# Patient Record
Sex: FEMALE | Race: OTHER | Employment: FULL TIME | URBAN - METROPOLITAN AREA
[De-identification: names, ages, dates, MRNs, and addresses within clinical notes are randomized per-mention and may not be internally consistent; named-entity substitution may affect disease eponyms.]

---

## 2022-06-22 PROBLEM — N94.6 DYSMENORRHEA: Status: ACTIVE | Noted: 2022-06-22

## 2022-06-22 PROBLEM — E66.9 CLASS 2 OBESITY WITH BODY MASS INDEX (BMI) OF 36.0 TO 36.9 IN ADULT: Status: ACTIVE | Noted: 2022-06-22

## 2022-06-22 PROBLEM — E66.01 CLASS 2 SEVERE OBESITY DUE TO EXCESS CALORIES WITH SERIOUS COMORBIDITY AND BODY MASS INDEX (BMI) OF 36.0 TO 36.9 IN ADULT (HCC): Status: ACTIVE | Noted: 2022-06-22

## 2022-06-22 PROBLEM — K76.0 NAFL (NONALCOHOLIC FATTY LIVER): Status: ACTIVE | Noted: 2022-06-22

## 2022-10-27 ENCOUNTER — OFFICE VISIT (OUTPATIENT)
Dept: OBGYN CLINIC | Age: 42
End: 2022-10-27
Payer: COMMERCIAL

## 2022-10-27 VITALS
RESPIRATION RATE: 20 BRPM | HEIGHT: 64 IN | HEART RATE: 60 BPM | WEIGHT: 213.4 LBS | DIASTOLIC BLOOD PRESSURE: 87 MMHG | SYSTOLIC BLOOD PRESSURE: 156 MMHG | BODY MASS INDEX: 36.43 KG/M2 | OXYGEN SATURATION: 99 %

## 2022-10-27 DIAGNOSIS — N94.6 DYSMENORRHEA: Primary | ICD-10-CM

## 2022-10-27 DIAGNOSIS — N92.6 IRREGULAR PERIODS/MENSTRUAL CYCLES: ICD-10-CM

## 2022-10-27 PROBLEM — D21.9 FIBROIDS: Status: ACTIVE | Noted: 2022-10-27

## 2022-10-27 PROCEDURE — 99204 OFFICE O/P NEW MOD 45 MIN: CPT | Performed by: OBSTETRICS & GYNECOLOGY

## 2022-10-27 RX ORDER — MEDROXYPROGESTERONE ACETATE 150 MG/ML
150 INJECTION, SUSPENSION INTRAMUSCULAR ONCE
Qty: 1 EACH | Refills: 1 | Status: SHIPPED | OUTPATIENT
Start: 2022-10-27 | End: 2022-10-27

## 2022-10-27 NOTE — PROGRESS NOTES
Quinton Bates is a , 35 y.o. female   Patient's last menstrual period was 10/06/2022 (exact date). She presents for her problem    She is having  dysmenorrhea and irregular cycles- had an ablation in - initially did okay but now back to previous menses with pain prior to cycle and for the first several days . Tried OCP many years ago  Denies any  or GI sxs      Menstrual status:  Cycles are often irregular with no apparent pattern. Flow: moderate. She has dysmenorrhea. Medical conditions:  Since her last annual GYN exam about two years ago, she has not the following changes in her health history: none. Mammogram History:    YULY Results (most recent):  No results found for this or any previous visit. DEXA Results (most recent):  No results found for this or any previous visit. Past Medical History:   Diagnosis Date    Anemia     in childhood    History of mammography, screening      NORMAL     Past Surgical History:   Procedure Laterality Date    HX ENDOSCOPY      HX GYN  2021    ablation     HX SEPTOPLASTY      HX TUBAL LIGATION         Prior to Admission medications    Medication Sig Start Date End Date Taking? Authorizing Provider   medroxyPROGESTERone (DEPO-PROVERA) 150 mg/mL syrg 1 mL by IntraMUSCular route once for 1 dose. 10/27/22 10/27/22 Yes Max Feng MD   ibuprofen 200 mg cap Take  by mouth. Provider, Historical   cetirizine (ZyrTEC) 10 mg tablet Take 10 mg by mouth daily as needed for Allergies. Provider, Historical   Esomeprazole Magnesium (NexIUM Packet) Take 20 mg by mouth daily as needed for Gastroesophageal Reflux Disease (GERD). Provider, Historical   FLUoxetine (PROzac) 10 mg tablet Take 10 mg by mouth daily as needed. Provider, Historical       No Known Allergies       Tobacco History:  reports that she has never smoked.  She has never used smokeless tobacco.  Alcohol use:  reports no history of alcohol use.  Drug use:  reports no history of drug use. Family Medical/Cancer History:   Family History   Problem Relation Age of Onset    Hypertension Mother     Anemia Neg Hx           Review of Systems   Constitutional:  Negative for chills, fever, malaise/fatigue and weight loss. HENT:  Negative for congestion, ear pain, sinus pain and tinnitus. Eyes:  Negative for blurred vision and double vision. Respiratory:  Negative for cough, shortness of breath and wheezing. Cardiovascular:  Negative for chest pain and palpitations. Gastrointestinal:  Negative for abdominal pain, blood in stool, constipation, diarrhea, heartburn, nausea and vomiting. Genitourinary:  Negative for dysuria, flank pain, frequency, hematuria and urgency. Musculoskeletal:  Negative for joint pain and myalgias. Skin:  Negative for itching and rash. Neurological:  Negative for dizziness, weakness and headaches. Psychiatric/Behavioral:  Negative for depression, memory loss and suicidal ideas. The patient is not nervous/anxious and does not have insomnia. Physical Exam  Constitutional:       Appearance: Normal appearance. HENT:      Head: Normocephalic and atraumatic. Pulmonary:      Effort: Pulmonary effort is normal.   Abdominal:      General: Abdomen is flat. Palpations: Abdomen is soft. Genitourinary:     General: Normal vulva. Vagina: Normal.      Cervix: Normal.      Uterus: Normal.       Adnexa: Right adnexa normal and left adnexa normal.      Rectum: Normal.      Comments: No PAP obtained  Neurological:      Mental Status: She is alert. Psychiatric:         Mood and Affect: Mood normal.         Behavior: Behavior normal.         Thought Content:  Thought content normal.        Visit Vitals  BP (!) 156/87 (BP 1 Location: Right upper arm, BP Patient Position: Sitting, BP Cuff Size: Adult)   Pulse 60   Resp 20   Ht 5' 4\" (1.626 m)   Wt 213 lb 6.4 oz (96.8 kg)   LMP 10/06/2022 (Exact Date)   SpO2 99% BMI 36.63 kg/m²         Assessment: Diagnoses and all orders for this visit:    1. Dysmenorrhea    2. Irregular periods/menstrual cycles    Other orders  -     medroxyPROGESTERone (DEPO-PROVERA) 150 mg/mL syrg; 1 mL by IntraMUSCular route once for 1 dose. Diff. Dx and anatomy DWP in detail- endometriosis, adenomyosis, fibroids  Options DWP- BC, LAVH, nothing, repeat ablation( not recommended)    Plan: Questions addressed, Desires a trial of Depo, f/up in 3 mths  Counseled re: diet, exercise, healthy lifestyle  Return for Annual  Rec annual mammogram        Follow-up and Dispositions    Return in about 3 months (around 1/27/2023) for Follow-up.

## 2022-10-27 NOTE — PROGRESS NOTES
1. Have you been to the ER, urgent care clinic since your last visit? Hospitalized since your last visit? No    2. Have you seen or consulted any other health care providers outside of the [address] [place] [address] since your last visit? Include any pap smears or colon screening.  No    Visit Vitals  BP (!) 156/87 (BP 1 Location: Right upper arm, BP Patient Position: Sitting, BP Cuff Size: Adult)   Pulse 60   Resp 20   Ht 5' 4\" (1.626 m)   Wt 213 lb 6.4 oz (96.8 kg)   LMP 10/06/2022 (Exact Date)   SpO2 99%   BMI 36.63 kg/m²       Chief Complaint   Patient presents with    New Patient    Irregular Menses

## 2022-10-31 ENCOUNTER — OFFICE VISIT (OUTPATIENT)
Dept: FAMILY MEDICINE CLINIC | Age: 42
End: 2022-10-31
Payer: COMMERCIAL

## 2022-10-31 VITALS
BODY MASS INDEX: 36.67 KG/M2 | HEART RATE: 70 BPM | HEIGHT: 64 IN | DIASTOLIC BLOOD PRESSURE: 80 MMHG | TEMPERATURE: 97.5 F | SYSTOLIC BLOOD PRESSURE: 124 MMHG | WEIGHT: 214.8 LBS | OXYGEN SATURATION: 99 %

## 2022-10-31 DIAGNOSIS — Z11.59 SCREENING FOR VIRAL DISEASE: ICD-10-CM

## 2022-10-31 DIAGNOSIS — Z23 ENCOUNTER FOR IMMUNIZATION: ICD-10-CM

## 2022-10-31 DIAGNOSIS — Z13.31 DEPRESSION SCREENING: ICD-10-CM

## 2022-10-31 DIAGNOSIS — Z00.00 WELLNESS EXAMINATION: Primary | ICD-10-CM

## 2022-10-31 DIAGNOSIS — Z13.220 SCREENING FOR LIPOID DISORDERS: ICD-10-CM

## 2022-10-31 DIAGNOSIS — E66.01 CLASS 2 SEVERE OBESITY DUE TO EXCESS CALORIES WITH SERIOUS COMORBIDITY AND BODY MASS INDEX (BMI) OF 36.0 TO 36.9 IN ADULT (HCC): ICD-10-CM

## 2022-10-31 PROCEDURE — 90715 TDAP VACCINE 7 YRS/> IM: CPT | Performed by: FAMILY MEDICINE

## 2022-10-31 PROCEDURE — 90471 IMMUNIZATION ADMIN: CPT | Performed by: FAMILY MEDICINE

## 2022-10-31 PROCEDURE — 99396 PREV VISIT EST AGE 40-64: CPT | Performed by: FAMILY MEDICINE

## 2022-10-31 RX ORDER — MEDROXYPROGESTERONE ACETATE 150 MG/ML
INJECTION, SUSPENSION INTRAMUSCULAR
COMMUNITY
Start: 2022-10-27

## 2022-10-31 NOTE — PROGRESS NOTES
Subjective  Chief Complaint   Patient presents with    Annual Wellness Visit     HPI:  Darlin Desai is a 35 y.o. female. Darlin Desai is on the schedule today for annual wellness exam.  Our plan was to utilize the previous PCP notes for review. We never received those to date. For the wellness:   Flu vaccine- Recommended every fall  COVID vaccine primary series- complete  Tetanus- Tdap ?   Shingrix- series at age 46  Pneumovax 21-  N/A  Prevnar 21- at age 70  HCV screening- complete  Pap- June 2022 per pt with Dr. Chauncey Key- June 2022 with Dr. Tania Cohen- at age 70  Colon cancer screening- at age 37  Smoking status- never  Low dose CT scan- N/A  PHQ2 Score = 2  Exercise- none        Past Medical History:   Diagnosis Date    Anemia     in childhood    History of mammography, screening 2001     NORMAL     Family History   Problem Relation Age of Onset    Hypertension Mother     Anemia Neg Hx      Social History     Socioeconomic History    Marital status: LEGALLY      Spouse name: Not on file    Number of children: Not on file    Years of education: Not on file    Highest education level: Not on file   Occupational History    Not on file   Tobacco Use    Smoking status: Never    Smokeless tobacco: Never   Vaping Use    Vaping Use: Never used   Substance and Sexual Activity    Alcohol use: No    Drug use: No    Sexual activity: Not on file   Other Topics Concern    Not on file   Social History Narrative    Not on file     Social Determinants of Health     Financial Resource Strain: Low Risk     Difficulty of Paying Living Expenses: Not hard at all   Food Insecurity: No Food Insecurity    Worried About Running Out of Food in the Last Year: Never true    Ran Out of Food in the Last Year: Never true   Transportation Needs: Not on file   Physical Activity: Not on file   Stress: Not on file   Social Connections: Not on file   Intimate Partner Violence: Not on file   Housing Stability: Not on file Current Outpatient Medications on File Prior to Visit   Medication Sig Dispense Refill    ibuprofen 200 mg cap Take  by mouth. cetirizine (ZYRTEC) 10 mg tablet Take 10 mg by mouth daily as needed for Allergies. Esomeprazole Magnesium Take 20 mg by mouth daily as needed for Gastroesophageal Reflux Disease (GERD). FLUoxetine (PROzac) 10 mg tablet Take 10 mg by mouth daily as needed. medroxyPROGESTERone (DEPO-PROVERA) 150 mg/mL syrg  (Patient not taking: Reported on 10/31/2022)       No current facility-administered medications on file prior to visit. No Known Allergies    Objective  Visit Vitals  /80 (BP 1 Location: Right arm, BP Patient Position: At rest, BP Cuff Size: Adult)   Pulse 70   Temp 97.5 °F (36.4 °C) (Temporal)   Ht 5' 4\" (1.626 m)   Wt 214 lb 12.8 oz (97.4 kg)   SpO2 99%   BMI 36.87 kg/m²     Physical Exam  Constitutional:       General: She is not in acute distress. Appearance: Normal appearance. She is obese. HENT:      Head: Normocephalic and atraumatic. Neck:      Thyroid: No thyroid mass or thyromegaly. Cardiovascular:      Rate and Rhythm: Normal rate and regular rhythm. Heart sounds: No murmur heard. Pulmonary:      Effort: Pulmonary effort is normal. No respiratory distress. Breath sounds: Normal breath sounds. No wheezing. Musculoskeletal:      Cervical back: Neck supple. No muscular tenderness. Right lower leg: No edema. Left lower leg: No edema. Lymphadenopathy:      Cervical: No cervical adenopathy. Skin:     General: Skin is warm and dry. Neurological:      Mental Status: She is alert and oriented to person, place, and time. Mental status is at baseline. Psychiatric:         Attention and Perception: Attention and perception normal.         Mood and Affect: Mood and affect normal.         Speech: Speech normal.         Behavior: Behavior normal.        Assessment & Plan    ICD-10-CM ICD-9-CM    1.  Wellness examination Z00.00 V70.0       2. Depression screening  Z13.31 V79.0       3. Screening for viral disease  Z11.59 V73.99 HEPATITIS C AB, RFLX TO QT BY PCR      4. Screening for lipoid disorders  Z13.220 V77.91 LIPID PANEL      METABOLIC PANEL, COMPREHENSIVE      CBC WITH AUTOMATED DIFF      5. Encounter for immunization  Z23 V03.89 TDAP, BOOSTRIX, (AGE 10 YRS+), IM      6. Class 2 severe obesity due to excess calories with serious comorbidity and body mass index (BMI) of 36.0 to 36.9 in adult (HCC)  E66.01 278.01 THYROID CASCADE PROFILE    Z68.36 V85.36         Diagnoses and all orders for this visit:    1. Wellness examination  We are getting patient up to date on recommended preventative measures as noted. We will attempt to get records of last Pap smear and mammogram from her gynecology office. 2. Depression screening    3. Screening for viral disease  -     HEPATITIS C AB, RFLX TO QT BY PCR    4. Screening for lipoid disorders  -     LIPID PANEL  -     METABOLIC PANEL, COMPREHENSIVE  -     CBC WITH AUTOMATED DIFF    5. Encounter for immunization  -     TDAP, BOOSTRIX, (AGE 10 YRS+), IM    6. Class 2 severe obesity due to excess calories with serious comorbidity and body mass index (BMI) of 36.0 to 36.9 in adult [facility])  -     THYROID CASCADE PROFILE  She reports that between work and school, time is a large constraint on being able to exercise. We talked about adding 5 to 10 minutes in prior to her shower in the morning and then perhaps another 5 to 10 minutes in the evenings. Once she gets settled on that she can increase. We reviewed how this could actually make studying more efficient as well as the other health benefits it would provide. Aspects of this note have been generated using voice recognition software. Despite editing, there may be some syntax errors. Follow-up and Dispositions    Return in about 1 year (around 10/31/2023).        Antonio Pope MD

## 2022-10-31 NOTE — PROGRESS NOTES
Chief Complaint   Patient presents with    Annual Wellness Visit     1. Have you been to the ER, urgent care clinic since your last visit? Hospitalized since your last visit? No    2. Have you seen or consulted any other health care providers outside of the [address] [place] [address] since your last visit? Include any pap smears or colon screening.  No      3 most recent PHQ Screens 10/31/2022   Little interest or pleasure in doing things Several days   Feeling down, depressed, irritable, or hopeless Several days   Total Score PHQ 2 2

## 2022-11-04 ENCOUNTER — OFFICE VISIT (OUTPATIENT)
Dept: OBGYN CLINIC | Age: 42
End: 2022-11-04
Payer: COMMERCIAL

## 2022-11-04 VITALS
HEART RATE: 63 BPM | HEIGHT: 64 IN | RESPIRATION RATE: 20 BRPM | DIASTOLIC BLOOD PRESSURE: 86 MMHG | OXYGEN SATURATION: 97 % | BODY MASS INDEX: 36.52 KG/M2 | SYSTOLIC BLOOD PRESSURE: 156 MMHG | WEIGHT: 213.9 LBS

## 2022-11-04 DIAGNOSIS — N94.89 SUPPRESSION OF MENSES: Primary | ICD-10-CM

## 2022-11-04 DIAGNOSIS — N91.2 AMENORRHEA: ICD-10-CM

## 2022-11-04 PROCEDURE — 81025 URINE PREGNANCY TEST: CPT | Performed by: OBSTETRICS & GYNECOLOGY

## 2022-11-04 PROCEDURE — 96372 THER/PROPH/DIAG INJ SC/IM: CPT | Performed by: OBSTETRICS & GYNECOLOGY

## 2022-11-04 RX ORDER — MEDROXYPROGESTERONE ACETATE 150 MG/ML
150 INJECTION, SUSPENSION INTRAMUSCULAR ONCE
Status: COMPLETED | OUTPATIENT
Start: 2022-11-04 | End: 2022-11-04

## 2022-11-04 RX ADMIN — MEDROXYPROGESTERONE ACETATE 150 MG: 150 INJECTION, SUSPENSION INTRAMUSCULAR at 15:33

## 2022-11-08 LAB
HCG URINE, QL. (POC): NEGATIVE
VALID INTERNAL CONTROL?: YES

## 2022-11-29 ENCOUNTER — TELEPHONE (OUTPATIENT)
Dept: FAMILY MEDICINE CLINIC | Age: 42
End: 2022-11-29

## 2022-11-29 NOTE — TELEPHONE ENCOUNTER
Reason for call: Pt calling--she tested positive for COVID on Sunday. She is having a headache, runny nose, sore throat, and a cough. She is requesting Paxlovid to be sent to her pharmacy.      [facility]/pharmacy #[id number]- [address] AT [facility]  [phone]    Is this a new problem: yes     Date of last appointment:  10/31/2022     Can we respond via [employer]: no    Best call back number: [phone]

## 2022-11-30 NOTE — TELEPHONE ENCOUNTER
Done.  Fully isolate through Friday. If feeling better and no fever then she can discontinue isolation starting Saturday but should adhere to strict mask wearing around others through Wednesday.

## 2023-05-23 ENCOUNTER — TELEPHONE (OUTPATIENT)
Facility: CLINIC | Age: 43
End: 2023-05-23

## 2023-07-20 ENCOUNTER — OFFICE VISIT (OUTPATIENT)
Facility: CLINIC | Age: 43
End: 2023-07-20
Payer: COMMERCIAL

## 2023-07-20 VITALS
BODY MASS INDEX: 37.22 KG/M2 | OXYGEN SATURATION: 97 % | DIASTOLIC BLOOD PRESSURE: 82 MMHG | HEART RATE: 76 BPM | HEIGHT: 64 IN | WEIGHT: 218 LBS | SYSTOLIC BLOOD PRESSURE: 134 MMHG | TEMPERATURE: 97.3 F

## 2023-07-20 DIAGNOSIS — K76.0 NAFL (NONALCOHOLIC FATTY LIVER): ICD-10-CM

## 2023-07-20 DIAGNOSIS — Z02.0 SCHOOL PHYSICAL EXAM: Primary | ICD-10-CM

## 2023-07-20 PROBLEM — D21.9 FIBROIDS: Status: RESOLVED | Noted: 2022-10-27 | Resolved: 2023-07-20

## 2023-07-20 PROCEDURE — 99396 PREV VISIT EST AGE 40-64: CPT | Performed by: STUDENT IN AN ORGANIZED HEALTH CARE EDUCATION/TRAINING PROGRAM

## 2023-07-20 SDOH — ECONOMIC STABILITY: HOUSING INSECURITY
IN THE LAST 12 MONTHS, WAS THERE A TIME WHEN YOU DID NOT HAVE A STEADY PLACE TO SLEEP OR SLEPT IN A SHELTER (INCLUDING NOW)?: NO

## 2023-07-20 SDOH — ECONOMIC STABILITY: INCOME INSECURITY: HOW HARD IS IT FOR YOU TO PAY FOR THE VERY BASICS LIKE FOOD, HOUSING, MEDICAL CARE, AND HEATING?: NOT HARD AT ALL

## 2023-07-20 SDOH — ECONOMIC STABILITY: FOOD INSECURITY: WITHIN THE PAST 12 MONTHS, THE FOOD YOU BOUGHT JUST DIDN'T LAST AND YOU DIDN'T HAVE MONEY TO GET MORE.: NEVER TRUE

## 2023-07-20 SDOH — ECONOMIC STABILITY: FOOD INSECURITY: WITHIN THE PAST 12 MONTHS, YOU WORRIED THAT YOUR FOOD WOULD RUN OUT BEFORE YOU GOT MONEY TO BUY MORE.: NEVER TRUE

## 2023-07-20 ASSESSMENT — PATIENT HEALTH QUESTIONNAIRE - PHQ9
SUM OF ALL RESPONSES TO PHQ QUESTIONS 1-9: 1
SUM OF ALL RESPONSES TO PHQ QUESTIONS 1-9: 1
1. LITTLE INTEREST OR PLEASURE IN DOING THINGS: 0
2. FEELING DOWN, DEPRESSED OR HOPELESS: 1
SUM OF ALL RESPONSES TO PHQ9 QUESTIONS 1 & 2: 1
SUM OF ALL RESPONSES TO PHQ QUESTIONS 1-9: 1
SUM OF ALL RESPONSES TO PHQ QUESTIONS 1-9: 1

## 2023-07-20 NOTE — PROGRESS NOTES
2023    Jocelyn Hernandez (:  1980) is a 38 y.o. female who presents for school physical.  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 She needs physical and documentation signed. She had titers checked and she needs hepatitis B vaccination MMR. Titers were negative. She has already received 1 vaccination of each and is scheduled to have her second hepatitis B and MMR this weekend. Need to get her TB testing done in a month or so. She needs a letter stating she is up-to-date with her flu vaccine. She has no physical ailments or disabilities. She is overall healthy. Patient Active Problem List   Diagnosis    NAFL (nonalcoholic fatty liver)    Class 2 severe obesity due to excess calories with serious comorbidity and body mass index (BMI) of 36.0 to 36.9 in [facility])    LUQ pain       Review of Systems   All other systems reviewed and are negative. Prior to Visit Medications    Medication Sig Taking?  Authorizing Provider   cetirizine (ZYRTEC) 10 MG tablet Take 1 tablet by mouth daily as needed Yes Ar Automatic Reconciliation   esomeprazole Magnesium (NEXIUM) 20 MG PACK Take 1 packet by mouth daily as needed Yes Ar Automatic Reconciliation   FLUoxetine HCl, PMDD, 10 MG TABS Take 10 mg by mouth daily as needed Yes Ar Automatic Reconciliation   ibuprofen (ADVIL;MOTRIN) 200 MG CAPS capsule Take by mouth Yes Ar Automatic Reconciliation        No Known Allergies    Past Medical History:   Diagnosis Date    Anemia     in childhood    Fibroids 10/27/2022    History of mammography, screening      NORMAL       Past Surgical History:   Procedure Laterality Date    GYN  2021    ablation     HYSTERECTOMY, TOTAL ABDOMINAL (CERVIX REMOVED)  2023    Both ovaries still intact    SEPTOPLASTY      TUBAL LIGATION  2002    UPPER GASTROINTESTINAL ENDOSCOPY         Social History     Socioeconomic History    Marital status: Legally      Spouse name: Not on file    Number of

## 2023-07-20 NOTE — PROGRESS NOTES
Chief Complaint   Patient presents with    Forms         1. Have you been to the ER, urgent care clinic since your last visit? Hospitalized since your last visit? Yes Fannie Delcid 4/2023 for Hysterectomy    2. Have you seen or consulted any other health care providers outside of the [address] [place] [address] since your last visit? Yes Dr. Samara Victor (GYN)      3. For patients aged 43-73: Has the patient had a colonoscopy / FIT/ Cologuard? NA - based on age/sex    If the patient is female:    4. For patients aged 43-66: Has the patient had a mammogram within the past 2 years? Yes-No Care Gap Present      5. For patients aged 21-65: Has the patient had a pap smear?   Yes-No Care Gap Present    PHQ-9 Total Score: 1 (7/20/2023  7:18 AM)

## 2023-11-03 ENCOUNTER — TELEMEDICINE (OUTPATIENT)
Facility: CLINIC | Age: 43
End: 2023-11-03
Payer: COMMERCIAL

## 2023-11-03 DIAGNOSIS — J30.9 ALLERGIC SINUSITIS: Primary | ICD-10-CM

## 2023-11-03 PROCEDURE — 99213 OFFICE O/P EST LOW 20 MIN: CPT | Performed by: FAMILY MEDICINE

## 2023-11-03 RX ORDER — FLUTICASONE PROPIONATE 50 MCG
2 SPRAY, SUSPENSION (ML) NASAL DAILY
Qty: 16 G | Refills: 3 | Status: SHIPPED | OUTPATIENT
Start: 2023-11-03

## 2023-11-03 RX ORDER — CETIRIZINE HYDROCHLORIDE 10 MG/1
10 TABLET ORAL DAILY
Qty: 30 TABLET | Refills: 3 | Status: SHIPPED | OUTPATIENT
Start: 2023-11-03

## 2023-11-03 ASSESSMENT — PATIENT HEALTH QUESTIONNAIRE - PHQ9
SUM OF ALL RESPONSES TO PHQ QUESTIONS 1-9: 0
1. LITTLE INTEREST OR PLEASURE IN DOING THINGS: 0
SUM OF ALL RESPONSES TO PHQ QUESTIONS 1-9: 0
SUM OF ALL RESPONSES TO PHQ9 QUESTIONS 1 & 2: 0
SUM OF ALL RESPONSES TO PHQ QUESTIONS 1-9: 0
2. FEELING DOWN, DEPRESSED OR HOPELESS: 0
SUM OF ALL RESPONSES TO PHQ QUESTIONS 1-9: 0

## 2023-11-03 NOTE — PROGRESS NOTES
Chief Complaint   Patient presents with    OTHER     Earache, congestion, body ache x3days    1. Have you been to the ER, urgent care clinic since your last visit? Hospitalized since your last visit? No    2. Have you seen or consulted any other health care providers outside of the [address] [place] [address] since your last visit? No     3. For patients aged 43-73: Has the patient had a colonoscopy / FIT/ Cologuard? NA - based on age/sex    If the patient is female:    4. For patients aged 43-66: Has the patient had a mammogram within the past 2 years? Yes-No Care Gap Present      5. For patients aged 21-65: Has the patient had a pap smear?   Yes-No Care Gap Present
(FLONASE) 50 MCG/ACT nasal spray; 2 sprays by Each Nostril route daily  Dispense: 16 g; Refill: 3  - cetirizine (ZYRTEC) 10 MG tablet; Take 1 tablet by mouth daily  Dispense: 30 tablet; Refill: 3       RTC prn if symptoms worsen or fail to improve      Discussed diagnoses in detail with patient. Medication risks/benefits/side effects discussed with patient. All of the patient's questions were addressed. The patient understands and agrees with our plan of care. The patient knows to call back if they are unsure of or forget any changes we discussed today or if the symptoms change. Kisha Dan is a 38 y.o. female being evaluated by a video visit encounter for concerns as above. A caregiver was present when appropriate. Due to this being a TeleHealth encounter, evaluation of the following organ systems was limited: Vitals/Constitutional/EENT/Resp/CV/GI//MS/Neuro/Skin/Heme-Lymph-Imm. Services were provided through a video synchronous discussion virtually to substitute for in-person clinic visit. Current Outpatient Medications on File Prior to Visit   Medication Sig Dispense Refill    ibuprofen (ADVIL;MOTRIN) 200 MG CAPS capsule Take by mouth       No current facility-administered medications on file prior to visit.